# Patient Record
Sex: MALE | Race: WHITE | NOT HISPANIC OR LATINO | URBAN - METROPOLITAN AREA
[De-identification: names, ages, dates, MRNs, and addresses within clinical notes are randomized per-mention and may not be internally consistent; named-entity substitution may affect disease eponyms.]

---
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
## 2022-02-11 VITALS
HEIGHT: 69 IN | HEART RATE: 72 BPM | TEMPERATURE: 97.5 F | TEMPERATURE: 98.6 F | SYSTOLIC BLOOD PRESSURE: 96 MMHG | BODY MASS INDEX: 19.4 KG/M2 | OXYGEN SATURATION: 98 % | HEART RATE: 66 BPM | SYSTOLIC BLOOD PRESSURE: 98 MMHG | OXYGEN SATURATION: 97 % | DIASTOLIC BLOOD PRESSURE: 54 MMHG | DIASTOLIC BLOOD PRESSURE: 66 MMHG | RESPIRATION RATE: 16 BRPM | WEIGHT: 131.2 LBS | WEIGHT: 131 LBS
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
## 2022-02-16 NOTE — PROGRESS NOTES
Chief Complaint    Productive cough, runny nose, headaches, bodyaches for over a week, asthma symptoms: SOB.  History of Present Illness      Cough with congestion, low grade fevers, body aches and earaches. Has sinus pressure with post nasal drip. Has had many ear infections in past.  Review of Systems      No fevers. Temperature up to 100 degrees.       No vomiting       No tooth pain       Has not needed albuterol or pulmicort for 2 years. Now using albuterol 1-2x daily for past week       Has used NETI pot in past  Physical Exam   Vitals & Measurements    T: 98.6(Tympanic)  HR: 66(Peripheral)  BP: 96/66  SpO2: 98%     WT: 131.2 lb       General: No acute distress.      HENT: Tympanic membranes are clear, No pharyngeal erythema.      Neck: No lymphadenopathy.      Respiratory: Diffuse expiratory wheezing mild      Cardiovascular: Normal rate, Regular rhythm.      Musculoskeletal: Normal gait.  Assessment/Plan   Sinusitis: Restart NETI pot and start Zithromax if not improving    Reactive airway disease: Prednisone for 5 days.  Will restart Pulmicort and take it for a month.    Letter written to explain his absence from track    Follow up if not getting better  Patient Information     Name:MORALES NUGENT      Address:      [address] [place] [id number]     Sex:Male     YOB: 2000     [phone]     Emergency Contact:NOLBERTO MANRIQUE     MRN:[id number]     FIN:[id number]     Location:[facility]     Date of Service:04/09/2018      Primary Care Physician:       NONE ,   Problem List/Past Medical History    Ongoing     No qualifying data    Historical     History of concussion injury of brain      Comments: x2  Procedure/Surgical History     No previous procedures  Medications        albuterol 90mcg/inhalation MDI: 0 Refill(s).        Pulmicort Flexhaler: 180 mcg, inh, bid, 0 Refill(s).                Allergies    Peanuts  Family History    Arthritis: Mother.    Cancer:  Aunt (M) and Grandfather (M).    Depression: Uncle (M).

## 2025-06-10 ENCOUNTER — OFFICE VISIT (OUTPATIENT)
Dept: INTERNAL MEDICINE | Facility: CLINIC | Age: 25
End: 2025-06-10
Payer: COMMERCIAL

## 2025-06-10 VITALS
SYSTOLIC BLOOD PRESSURE: 118 MMHG | BODY MASS INDEX: 21.92 KG/M2 | DIASTOLIC BLOOD PRESSURE: 70 MMHG | RESPIRATION RATE: 16 BRPM | OXYGEN SATURATION: 96 % | WEIGHT: 148 LBS | TEMPERATURE: 98.2 F | HEIGHT: 69 IN | HEART RATE: 76 BPM

## 2025-06-10 DIAGNOSIS — H61.23 BILATERAL IMPACTED CERUMEN: ICD-10-CM

## 2025-06-10 DIAGNOSIS — J01.90 ACUTE SINUSITIS WITH SYMPTOMS > 10 DAYS: Primary | ICD-10-CM

## 2025-06-10 PROCEDURE — 3078F DIAST BP <80 MM HG: CPT | Performed by: NURSE PRACTITIONER

## 2025-06-10 PROCEDURE — 99203 OFFICE O/P NEW LOW 30 MIN: CPT | Performed by: NURSE PRACTITIONER

## 2025-06-10 PROCEDURE — 3074F SYST BP LT 130 MM HG: CPT | Performed by: NURSE PRACTITIONER

## 2025-06-10 ASSESSMENT — ENCOUNTER SYMPTOMS: COUGH: 1

## 2025-06-10 NOTE — PROGRESS NOTES
"  Assessment & Plan     Acute sinusitis with symptoms > 10 days  History of issues and recurrent sinus and ear issues   He get ears sucked out at ENT and declined an ear wash today   - amoxicillin-clavulanate (AUGMENTIN) 875-125 MG tablet; Take 1 tablet by mouth 2 times daily.    Bilateral impacted cerumen  He will see ENT for removal - I cannot assess TM because of cerumen however if there is nay ionfection with ears the antibiotics should cover both the sinus and ear issues             Patient Instructions   Augmentin twice daily for 10 days     You do have wax in ears and could see ENT to get removed        Venancio Walters is a 24 year old, presenting for the following health issues:  Ear Problem and Cough      6/10/2025     7:31 AM   Additional Questions   Roomed by MARTHA Lake LPN   Accompanied by self         6/10/2025     7:31 AM   Patient Reported Additional Medications   Patient reports taking the following new medications none     Ear Problem  Associated symptoms include cough.   Cough  Associated symptoms include ear pain.   History of Present Illness       Headaches:   Since the patient's last clinic visit, headaches are: no change  The patient is getting headaches:  Slight headache all day  He is able to do normal daily activities when he has a migraine.  The patient is taking the following rescue/relief medications:  No rescue/relief medications   Patient states \"I get total relief\" from the rescue/relief medications.   The patient is taking the following medications to prevent migraines:  No medications to prevent migraines  In the past 4 weeks, the patient has gone to an Urgent Care or Emergency Room 0 times times due to headaches.    Reason for visit:  Ears hurt, throat hurts, headache, slight trouble breathing,lots of stuff to spit out and coughing and very runny nose  Symptom onset:  3-7 days ago  Symptom intensity:  Mild  Symptom progression:  Staying the same  Had these symptoms before:  " "No  What makes it better:  Spitting a lot of stuff out   He is taking medications regularly.        Both ear pain     Sees ENT regularly - for wax removal   Also TMJ so may make ears hurt     Sinus pain and pressure   Coughing up stuff with slight tint     Has epi pen at home     Has asthma - stable           Review of Systems  Constitutional, neuro, ENT, endocrine, pulmonary, cardiac, gastrointestinal, genitourinary, musculoskeletal, integument and psychiatric systems are negative, except as otherwise noted.      Objective    /70   Pulse 76   Temp 98.2  F (36.8  C) (Oral)   Resp 16   Ht 1.753 m (5' 9\")   Wt 67.1 kg (148 lb)   SpO2 96%   BMI 21.86 kg/m    Body mass index is 21.86 kg/m .  Physical Exam   GENERAL: alert and c/o sinus pressure and pain and ear fullness   HENT: ear canals with cerumen bilaterally, nose and mouth without ulcers or lesions  Sinus pressure and pain over maxillary sinus   RESP: lungs clear   CV: regular rate and rhythm  PSYCH: mentation appears normal, affect normal/bright            Signed Electronically by: JEANMARIE Sousa CNP    "